# Patient Record
Sex: FEMALE | URBAN - METROPOLITAN AREA
[De-identification: names, ages, dates, MRNs, and addresses within clinical notes are randomized per-mention and may not be internally consistent; named-entity substitution may affect disease eponyms.]

---

## 2018-02-05 ENCOUNTER — TELEPHONE (OUTPATIENT)
Dept: CARDIOLOGY | Facility: CLINIC | Age: 78
End: 2018-02-05

## 2018-02-05 DIAGNOSIS — Z00.00 PHYSICAL EXAM: Primary | ICD-10-CM

## 2018-02-06 ENCOUNTER — OFFICE VISIT (OUTPATIENT)
Dept: CARDIOLOGY | Facility: CLINIC | Age: 78
End: 2018-02-06
Payer: MEDICARE

## 2018-02-06 ENCOUNTER — HOSPITAL ENCOUNTER (OUTPATIENT)
Dept: CARDIOLOGY | Facility: CLINIC | Age: 78
Discharge: HOME | End: 2018-02-06
Payer: MEDICARE

## 2018-02-06 VITALS
BODY MASS INDEX: 30.94 KG/M2 | SYSTOLIC BLOOD PRESSURE: 123 MMHG | DIASTOLIC BLOOD PRESSURE: 72 MMHG | HEIGHT: 63 IN | WEIGHT: 174.63 LBS | HEART RATE: 100 BPM

## 2018-02-06 DIAGNOSIS — R94.31 ABNORMAL ELECTROCARDIOGRAM: ICD-10-CM

## 2018-02-06 DIAGNOSIS — Z01.810 PREOPERATIVE CARDIOVASCULAR EXAMINATION: Primary | ICD-10-CM

## 2018-02-06 DIAGNOSIS — I10 ESSENTIAL HYPERTENSION: ICD-10-CM

## 2018-02-06 DIAGNOSIS — Z00.00 PHYSICAL EXAM: ICD-10-CM

## 2018-02-06 DIAGNOSIS — M81.0 OSTEOPOROSIS, UNSPECIFIED OSTEOPOROSIS TYPE, UNSPECIFIED PATHOLOGICAL FRACTURE PRESENCE: ICD-10-CM

## 2018-02-06 PROCEDURE — 99214 OFFICE O/P EST MOD 30 MIN: CPT | Mod: S$GLB,,, | Performed by: INTERNAL MEDICINE

## 2018-02-06 PROCEDURE — 1159F MED LIST DOCD IN RCRD: CPT | Mod: S$GLB,,, | Performed by: INTERNAL MEDICINE

## 2018-02-06 PROCEDURE — 99999 PR PBB SHADOW E&M-EST. PATIENT-LVL III: CPT | Mod: PBBFAC,,, | Performed by: INTERNAL MEDICINE

## 2018-02-06 PROCEDURE — 3008F BODY MASS INDEX DOCD: CPT | Mod: S$GLB,,, | Performed by: INTERNAL MEDICINE

## 2018-02-06 PROCEDURE — 93000 ELECTROCARDIOGRAM COMPLETE: CPT | Mod: S$GLB,,, | Performed by: INTERNAL MEDICINE

## 2018-02-06 PROCEDURE — 1126F AMNT PAIN NOTED NONE PRSNT: CPT | Mod: S$GLB,,, | Performed by: INTERNAL MEDICINE

## 2018-02-06 RX ORDER — ZINC GLUCONATE 50 MG
50 TABLET ORAL DAILY
COMMUNITY

## 2018-02-06 RX ORDER — MAGNESIUM 30 MG
TABLET ORAL ONCE
COMMUNITY

## 2018-02-06 RX ORDER — LISINOPRIL AND HYDROCHLOROTHIAZIDE 12.5; 2 MG/1; MG/1
TABLET ORAL
Refills: 1 | COMMUNITY
Start: 2017-12-26

## 2018-02-06 RX ORDER — NAPROXEN SODIUM 220 MG/1
81 TABLET, FILM COATED ORAL DAILY
COMMUNITY

## 2018-02-06 RX ORDER — ASCORBIC ACID 500 MG
500 TABLET ORAL DAILY
COMMUNITY

## 2018-02-06 RX ORDER — ALENDRONATE SODIUM 70 MG/1
TABLET ORAL
Refills: 2 | COMMUNITY
Start: 2017-12-12

## 2018-02-06 NOTE — PROGRESS NOTES
Chart has been dictated using voice recognition software.  It is not been reviewed carefully for any transcriptional errors due to this technology.   Subjective:   Patient ID:  Debora Cortes is a 77 y.o. female who presents for evaluation of Abnormal ECG and Pre-op Exam (lumpectomy)      HPI: patient with breast cancer being seen for preoperaative cardiovascular risk assessment prior to lumpectomy.  Patient noted to have an abnormal ECG on screening prior to surgery. Patient denies history of heart problems. Patient denies any chest discomfort on exertion or at rest.  Patient denies any dyspnea at rest or on exertion, orthopnea, PND, or edema.  Patient denies any palpitations, lightheadedness, or syncope. Patient denies lower extremity claudication, but notes cramps in legs when work in the house relieved with pickle juice.      Cardiac risk factors: hyperlipidemia, hypertension    History reviewed. Osteoporosis    History reviewed. No pertinent surgical history.    Social History   Substance Use Topics    Smoking status: Never Smoker    Smokeless tobacco: Never Used    Alcohol use No       No outpatient prescriptions prior to visit.     No facility-administered medications prior to visit.        Review of patient's allergies indicates:   Allergen Reactions    Penicillins     Scopolamine        Review of Systems   Constitution: Negative for weight gain and weight loss.   HENT: Negative for nosebleeds.    Eyes: Negative for vision loss in left eye and vision loss in right eye.   Cardiovascular: Negative for claudication.        As above   Respiratory: Negative for hemoptysis, shortness of breath, snoring, sputum production and wheezing.    Endocrine: Negative for polydipsia and polyuria.   Hematologic/Lymphatic: Does not bruise/bleed easily.   Musculoskeletal: Negative for myalgias.   Gastrointestinal: Positive for bloating (as a vegetarian). Negative for change in bowel habit, hematemesis, hematochezia,  "melena, nausea and vomiting.   Genitourinary: Negative for hematuria.   Neurological: Negative for focal weakness and numbness.     Objective:   Physical Exam   Constitutional: She is oriented to person, place, and time. She appears well-developed and well-nourished.   /72 (BP Location: Left arm, Patient Position: Sitting, BP Method: Medium (Automatic))   Pulse 100   Ht 5' 2.5" (1.588 m)   Wt 79.2 kg (174 lb 9.7 oz)   BMI 31.43 kg/m²    Neck: Neck supple. No JVD present. Carotid bruit is not present. No thyromegaly present.   Cardiovascular: Normal rate, regular rhythm, S1 normal, S2 normal and intact distal pulses.  Exam reveals S4. Exam reveals no friction rub.    No murmur heard.  Pulmonary/Chest: Breath sounds normal. She has no wheezes. She has no rales.   Abdominal: Soft. Bowel sounds are normal. There is no hepatosplenomegaly. There is no tenderness.   Musculoskeletal: She exhibits no edema.   Neurological: She is alert and oriented to person, place, and time. She has normal strength.   Skin: No cyanosis. Nails show no clubbing.       No results found for: WBC, HGB, HCT, MCV, PLT    No results found for: NA, K, BUN, CREATININE, GLU, HGBA1C, CHOL, HDL, LDLCALC, TRIG, CHOLHDL, HGB, HCT, PLT, INR     ECG shows sinus rhythm with normal intervals, marked left axis deviation, poor R-wave progression with possible evidence for lateral infarction of undetermined age.    Assessment:     1. Preoperative cardiovascular examination    2. Essential hypertension    3. Abnormal electrocardiogram    4. Osteoporosis, unspecified osteoporosis type, unspecified pathological fracture presence      Patient is an abnormal electrocardiogram with no cardiac history and no cardiac symptoms.  Only risk factors for coronary disease are hypertension and possible hypercholesterolemia.  However, at this time, not enough information is available to adequately evaluate the patient's cardiovascular risk for noncardiac surgery.  " Therefore, patient will have laboratories drawn and be sent for stress echocardiogram to evaluate whether she has coronary artery disease.  Further decisions be made following review of the laboratory and stress echo results.  Plan:     Debora was seen today for abnormal ecg and pre-op exam.    Diagnoses and all orders for this visit:    Preoperative cardiovascular examination    Essential hypertension    Abnormal electrocardiogram    Osteoporosis, unspecified osteoporosis type, unspecified pathological fracture presence    Other orders  -     lisinopril-hydrochlorothiazide (PRINZIDE,ZESTORETIC) 20-12.5 mg per tablet; TAKE 1 TABLET BY ORAL ROUTE ONCE DAILY  -     alendronate (FOSAMAX) 70 MG tablet; TAKE 1 TAB BY MOUTH ONCE A WEEK IN AM 30 MIN BEFORE FIRST FOOD BEVERAGE OR MEDICATION OF DAY  -     aspirin 81 MG Chew; Take 81 mg by mouth once daily.  -     CALCIUM CARBONATE/VITAMIN D3 (CALCIUM 500 + D ORAL); Take by mouth.  -     ascorbic acid, vitamin C, (VITAMIN C) 500 MG tablet; Take 500 mg by mouth once daily.  -     zinc gluconate 50 mg tablet; Take 50 mg by mouth once daily.  -     magnesium 30 mg Tab; Take by mouth once.          Kulwinder Coelho MD  Consultative Cardiology

## 2018-02-06 NOTE — LETTER
February 6, 2018      Annalisa Mckeon MD  [address]  [facility] [place] [address]           [facility] - Cardiology  [address]  [place] [address]  Phone: [phone]          Patient: Debora Cortes   MR Number: [id number]   YOB: 1940   Date of Visit: 2/6/2018       Dear Dr. Annalisa Mckeon:    Thank you for referring Debora Cortes to me for evaluation. Attached you will find relevant portions of my assessment and plan of care.    If you have questions, please do not hesitate to call me. I look forward to following Debora Cortes along with you.    Sincerely,    Kulwinder Coelho MD    Enclosure  CC:  No Recipients    If you would like to receive this communication electronically, please contact [email or web address] or [phone] to request more information on [facility] access.    For providers and/or their staff who would like to refer a patient to [facility], please contact us through our one-stop-shop provider referral line, [facility], at [phone].    If you feel you have received this communication in error or would no longer like to receive these types of communications, please e-mail [email or web address]

## 2018-02-06 NOTE — LETTER
February 7, 2018        Annalisa Mckeon MD  [address]  [facility] [place] [address]             [facility] - Cardiology  [address]  [place] [address]  Phone: [phone]   Patient: Debora Cortes   MR Number: [id number]   YOB: 1940   Date of Visit: 2/6/2018       Dear Dr. Mckeon:    Thank you for referring Debora Cortes to me for evaluation. Attached you will find addended relevant portions of my assessment and plan of care.    If you have questions, please do not hesitate to call me. I look forward to following Debora Cortes along with you.    Sincerely,      Kuwlinder Coelho MD            CC  No Recipients    Enclosure

## 2018-02-07 ENCOUNTER — HOSPITAL ENCOUNTER (OUTPATIENT)
Dept: CARDIOLOGY | Facility: CLINIC | Age: 78
Discharge: HOME | End: 2018-02-07
Attending: INTERNAL MEDICINE
Payer: MEDICARE

## 2018-02-07 DIAGNOSIS — Z01.810 PREOPERATIVE CARDIOVASCULAR EXAMINATION: ICD-10-CM

## 2018-02-07 DIAGNOSIS — R94.31 ABNORMAL ELECTROCARDIOGRAM: ICD-10-CM

## 2018-02-07 LAB
DIASTOLIC DYSFUNCTION: YES
ESTIMATED PA SYSTOLIC PRESSURE: 23.79
RETIRED EF AND QEF - SEE NOTES: 60 (ref 55–65)
TRICUSPID VALVE REGURGITATION: ABNORMAL

## 2018-02-07 PROCEDURE — 93351 STRESS TTE COMPLETE: CPT | Mod: S$GLB,,, | Performed by: INTERNAL MEDICINE

## 2018-02-07 PROCEDURE — 93320 DOPPLER ECHO COMPLETE: CPT | Mod: S$GLB,,, | Performed by: INTERNAL MEDICINE

## 2018-02-07 PROCEDURE — 93325 DOPPLER ECHO COLOR FLOW MAPG: CPT | Mod: S$GLB,,, | Performed by: INTERNAL MEDICINE

## 2018-02-08 NOTE — PROGRESS NOTES
Patient's stress echo showed no evidence of ischemia or LV dysfunction with exercise equivalent to 7 METS and heart rate attained greater 100% predicted.  Her CMP was normal with no evidence of renal or liver disease and no hyperglycemia.  Her cholesterol was mildly elevated, but the ASCVD Plus risk calculator shows no advantage to starting a statin.    Her estimated risk for an adverse cardiac outcome (myocardial infarction, pulmonary edema, ventricular fibrillation, cardiac arrest, or complete heart block) with the proposed surgery is very low (Revised Cardiac Risk Index [RCRI] - Rome Criteria - 0.4%).  No furhter cardiac testing is needed.   Her cardiac medications can be held on the day of surgery, if needed, and the may held for 7 days pror to the surgery, if desired.    The patient does not need routine cardiac follow-up and should return as needed.       Kulwinder Coehlo MD